# Patient Record
Sex: FEMALE | Race: WHITE | NOT HISPANIC OR LATINO | URBAN - METROPOLITAN AREA
[De-identification: names, ages, dates, MRNs, and addresses within clinical notes are randomized per-mention and may not be internally consistent; named-entity substitution may affect disease eponyms.]

---

## 2023-11-26 ENCOUNTER — EMERGENCY (EMERGENCY)
Facility: HOSPITAL | Age: 83
LOS: 1 days | Discharge: HOME | End: 2023-11-26
Attending: EMERGENCY MEDICINE
Payer: MEDICARE

## 2023-11-26 VITALS
WEIGHT: 130.07 LBS | SYSTOLIC BLOOD PRESSURE: 137 MMHG | TEMPERATURE: 98 F | RESPIRATION RATE: 18 BRPM | DIASTOLIC BLOOD PRESSURE: 75 MMHG | OXYGEN SATURATION: 100 % | HEIGHT: 70 IN | HEART RATE: 82 BPM

## 2023-11-26 VITALS
DIASTOLIC BLOOD PRESSURE: 77 MMHG | TEMPERATURE: 98 F | SYSTOLIC BLOOD PRESSURE: 129 MMHG | OXYGEN SATURATION: 100 % | HEART RATE: 76 BPM | RESPIRATION RATE: 18 BRPM

## 2023-11-26 PROCEDURE — 73080 X-RAY EXAM OF ELBOW: CPT | Mod: 26,LT

## 2023-11-26 PROCEDURE — 99284 EMERGENCY DEPT VISIT MOD MDM: CPT | Mod: 25

## 2023-11-26 PROCEDURE — 73564 X-RAY EXAM KNEE 4 OR MORE: CPT | Mod: 26,LT

## 2023-11-26 PROCEDURE — 73090 X-RAY EXAM OF FOREARM: CPT

## 2023-11-26 PROCEDURE — 29105 APPLICATION LONG ARM SPLINT: CPT | Mod: LT

## 2023-11-26 PROCEDURE — 70450 CT HEAD/BRAIN W/O DYE: CPT | Mod: MA

## 2023-11-26 PROCEDURE — 73090 X-RAY EXAM OF FOREARM: CPT | Mod: 26,LT

## 2023-11-26 PROCEDURE — 73060 X-RAY EXAM OF HUMERUS: CPT | Mod: 26,LT

## 2023-11-26 PROCEDURE — 73060 X-RAY EXAM OF HUMERUS: CPT

## 2023-11-26 PROCEDURE — 73564 X-RAY EXAM KNEE 4 OR MORE: CPT

## 2023-11-26 PROCEDURE — 99284 EMERGENCY DEPT VISIT MOD MDM: CPT | Mod: 25,GC

## 2023-11-26 PROCEDURE — 73080 X-RAY EXAM OF ELBOW: CPT

## 2023-11-26 PROCEDURE — 70450 CT HEAD/BRAIN W/O DYE: CPT | Mod: 26,MA

## 2023-11-26 RX ORDER — OXYCODONE HYDROCHLORIDE 5 MG/1
5 TABLET ORAL ONCE
Refills: 0 | Status: DISCONTINUED | OUTPATIENT
Start: 2023-11-26 | End: 2023-11-26

## 2023-11-26 RX ORDER — OXYCODONE HYDROCHLORIDE 5 MG/1
1 TABLET ORAL
Qty: 12 | Refills: 0
Start: 2023-11-26 | End: 2023-11-28

## 2023-11-26 RX ORDER — ACETAMINOPHEN 500 MG
650 TABLET ORAL ONCE
Refills: 0 | Status: COMPLETED | OUTPATIENT
Start: 2023-11-26 | End: 2023-11-26

## 2023-11-26 RX ADMIN — OXYCODONE HYDROCHLORIDE 5 MILLIGRAM(S): 5 TABLET ORAL at 22:07

## 2023-11-26 RX ADMIN — Medication 650 MILLIGRAM(S): at 19:31

## 2023-11-26 NOTE — ED ADULT NURSE NOTE - OBJECTIVE STATEMENT
84yo F presenting after accidental trip and fall at home. endorsing left elbow pain, head stike without LOC. on thinners (Xarelto).

## 2023-11-26 NOTE — ED ADULT NURSE NOTE - NSFALLHARMRISKINTERV_ED_ALL_ED

## 2023-11-26 NOTE — ED PROVIDER NOTE - CLINICAL SUMMARY MEDICAL DECISION MAKING FREE TEXT BOX
83F hx of Afib on Xarelto, breast CA in remission, prior L elbow surgery, L meniscus surgery presents for evaluation after trip and fall ~2 pm today w c/o head strike, no LOC, no neck or back pain, +L elbow pain, able to ambulate. Denies HA, vision change, NV. Initially had HA but has since resolved. L elbow pain is severe, worse w mvmt, no swelling. Also struck L knee with ecchymosis however able to ambulate and no pain w ROM. PE NC slight swelling and ecchymosis to R frontal bone along hairline, EOMI PERRL no midline CTL spine TTP no chest wall/abd/pelvis TTP, FROM in LUE and BL LE, Unable to range L elbow- held in 90 degree of flexion d/t pain, palp radial pulses, distal NV intact,  intact, no TTP L shoulder-upper arm, wrist, hand. Ecchymosis to L knee w FROM, minimal swelling, non tender. Will get Xrays LUE and L knee to eval for fracture. Will get CT head to r/o ICH given head strike on Xarelto, though low suspicion for any signif bleeding given injury occurred hours ago, A&Ox3, neuro intact.

## 2023-11-26 NOTE — ED PROVIDER NOTE - NSFOLLOWUPINSTRUCTIONS_ED_ALL_ED_FT
Please follow up with an orthopedic surgeon within the next week.   Return to the ER for any new or concerning symptoms.     You may take 650 mg acetaminophen  or 600 mg Motrin with food, every six hours as needed for pain.  Please only take oxycodone as needed for ever pain.    Drink plenty of fluids and rest.      Elbow Sprain    WHAT YOU NEED TO KNOW:    An elbow sprain is caused by a stretched or torn ligament in the elbow joint. Ligaments are the strong tissues that connect bones.    DISCHARGE INSTRUCTIONS:    Return to the emergency department if:    The skin of your injured arm looks bluish or pale (less color than normal).    You have new or increased numbness in your injured arm.  Call your doctor if:    You have increased swelling and pain in your elbow.    You have new or increased stiffness or trouble moving your injured arm.    You have questions or concerns about your condition or care.  Medicines:    Prescription pain medicine may be given. Ask your healthcare provider how to take this medicine safely. Some prescription pain medicines contain acetaminophen. Do not take other medicines that contain acetaminophen without talking to your healthcare provider. Too much acetaminophen may cause liver damage. Prescription pain medicine may cause constipation. Ask your healthcare provider how to prevent or treat constipation.    Take your medicine as directed. Contact your healthcare provider if you think your medicine is not helping or if you have side effects. Tell your provider if you are allergic to any medicine. Keep a list of the medicines, vitamins, and herbs you take. Include the amounts, and when and why you take them. Bring the list or the pill bottles to follow-up visits. Carry your medicine list with you in case of an emergency.  Rest your elbow: You will need to rest your elbow for 1 to 2 days after your injury. This will help decrease the risk of more damage to your elbow.    Ice your elbow: Apply ice on your elbow for 15 to 20 minutes every hour or as directed. Use an ice pack, or put crushed ice in a plastic bag. Cover it with a towel. Ice helps prevent tissue damage and decreases swelling and pain.    Compress your elbow: Compression provides support and helps decrease swelling and movement so your elbow can heal. You may be told to keep your elbow wrapped with a tight elastic bandage. Follow instructions about how to apply your bandage.    Elevate your elbow: Elevate your elbow above the level of your heart as often as you can. This will help decrease swelling and pain. Prop your elbow on pillows or blankets to keep it elevated comfortably.    Exercise your elbow: You should begin to exercise your arm in a few days, once you are able to move your elbow without pain. Exercises will help decrease stiffness and improve the strength of your arm. Ask your healthcare provider what kind of exercises you should do.    Prevent another elbow sprain:    Make sure you warm up and stretch before you exercise.    Do not exercise when you feel pain or you are tired.    Wear equipment to protect yourself when you play sports.    Stop exercising and playing sports if your symptoms from a past injury return.

## 2023-11-26 NOTE — ED PROVIDER NOTE - PROGRESS NOTE DETAILS
Dinora Xavier, PGY-2 DO:  Prelim xray read shows no fracture, concern for fracture given patients pain. Patient put in splint and told to f/u with orthopedic surgery. The patient agreeable to the plan.

## 2023-11-26 NOTE — ED PROVIDER NOTE - OBJECTIVE STATEMENT
83-year-old female with A-fib on Xarelto breast cancer in remission prior left elbow surgery and left meniscus surgery.  Presenting after a fall.  The patient reports that around 2 PM today she fell up and station.  The patient reports that she did hit her head, but she did not lose consciousness.  The patient was able to stand up and ambulate and went to see initial after.  The patient reports initially she did have a headache that has since subsided.  The patient reports what is bothering her the most right now is her left elbow.  The patient reports that she is not able to move it secondary to pain.  The patient reports that she had surgery in this elbow many years ago and has not had any issues since.  The patient denies fevers, chills, chest pain, shortness of breath, headache, visual changes.  The patient denies any prodromal symptoms prior to the fall.

## 2023-11-26 NOTE — ED PROVIDER NOTE - PHYSICAL EXAMINATION
GENERAL: Awake, alert, NAD  HEENT: lump over the forehead with no bruising, moist mucous membranes, PERRL, EOMI  LUNGS: CTAB, no wheezes or crackles   CARDIAC: RRR, no m/r/g  ABDOMEN: Soft, non tender, non distended, no rebound, no guarding  BACK: No midline spinal tenderness, no CVA tenderness  EXT: Limited ROM of the left elbow secondary to pain, no bruising or ecchymosis, bruising over the left knee.   NEURO: A&Ox3. Moving all extremities.  SKIN: Warm and dry. No rash.  PSYCH: Normal affect.

## 2023-11-26 NOTE — ED PROCEDURE NOTE - NS ED ATTENDING STATEMENT MOD
This was a shared visit with the HUGO. I reviewed and verified the documentation and independently performed the documented:

## 2023-11-26 NOTE — ED PROVIDER NOTE - PATIENT PORTAL LINK FT
You can access the FollowMyHealth Patient Portal offered by [facility] by registering at the following website: [email or web address]. By joining [employer]’s FollowMyHealth portal, you will also be able to view your health information using other applications (apps) compatible with our system.

## 2023-11-27 NOTE — ED POST DISCHARGE NOTE - RESULT SUMMARY
Radiology Discrepancy Reported On [place]: Xray of L knee: osseous fragmentation along the peripheral rim of the medial tibial plateau. This appears largely chronic in nature, however there is pain localized to this region further evaluation with CT is recommended to exclude an acute on chronic injury.

## 2023-11-27 NOTE — ED POST DISCHARGE NOTE - DETAILS
Per Attending exam in ED Provider note: "Ecchymosis to L knee w FROM, minimal swelling, non tender." Pt has hx of L meniscus surgery. Called and spoke with patient, informed of results and advised to f/u with orthopedic and consider CT of knee if pain persists/worsens. - BEST RahmanC
